# Patient Record
Sex: FEMALE | URBAN - METROPOLITAN AREA
[De-identification: names, ages, dates, MRNs, and addresses within clinical notes are randomized per-mention and may not be internally consistent; named-entity substitution may affect disease eponyms.]

---

## 2023-06-29 ENCOUNTER — OFFICE VISIT (OUTPATIENT)
Dept: URBAN - METROPOLITAN AREA CLINIC 5 | Facility: CLINIC | Age: 53
End: 2023-06-29

## 2023-06-29 DIAGNOSIS — E11.3532 DIABETES MELLITUS TYPE 2 WITH PROLIFERATIVE RETINOPATHY WITH TRACTION RETINAL DETACHMENT NOT INVOLVING THE MACULA, LEFT EYE: Primary | ICD-10-CM

## 2023-06-29 DIAGNOSIS — H43.13 VITREOUS HEMORRHAGE, BILATERAL: ICD-10-CM

## 2023-06-29 DIAGNOSIS — H25.13 AGE-RELATED NUCLEAR CATARACT, BILATERAL: ICD-10-CM

## 2023-06-29 DIAGNOSIS — E11.3511 DIABETES MELLITUS TYPE 2 WITH PROLIFERATIVE RETINOPATHY WITH MACULAR EDEMA, RIGHT EYE: ICD-10-CM

## 2023-06-29 DIAGNOSIS — H11.153 PINGUECULA, BILATERAL: ICD-10-CM

## 2023-06-29 PROCEDURE — 99204 OFFICE O/P NEW MOD 45 MIN: CPT | Performed by: OPTOMETRIST

## 2023-06-29 ASSESSMENT — INTRAOCULAR PRESSURE
OS: 21
OD: 19

## 2023-06-29 NOTE — IMPRESSION/PLAN
Impression: Diabetes mellitus Type 2 with proliferative retinopathy with macular edema, right eye: E11.3511.  Plan: See plan above

## 2023-06-29 NOTE — IMPRESSION/PLAN
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 Plan: Diabetes Mellitus Type II with Proliferative Diabetic Retinopathy both eyes with vitreous hemorrhage and possible tractional detachment OS - Discussed the pathophysiology of diabetes and its effect on the eye. Stressed the importance of strong glucose control. Advised of importance of scheduled dilated examinations, and to contact us immediately for any problems or concerns. Patient was referred to a retina specialist for further evaluation and management. Referral given to see [facility], [place] and To Dr Smallwood in [facility].

## 2023-06-29 NOTE — IMPRESSION/PLAN
Impression: Vitreous hemorrhage, bilateral: H43.13. Plan: Vitreous hemorrhage OS>OD ; Appears to have partial tractional detachment OS. Advised pt to sleep at a elevated angle ~ 45 degrees. See plan above.